# Patient Record
Sex: FEMALE | Race: WHITE | Employment: OTHER | URBAN - METROPOLITAN AREA
[De-identification: names, ages, dates, MRNs, and addresses within clinical notes are randomized per-mention and may not be internally consistent; named-entity substitution may affect disease eponyms.]

---

## 2019-03-01 ENCOUNTER — OFFICE VISIT (OUTPATIENT)
Dept: INTERNAL MEDICINE CLINIC | Age: 79
End: 2019-03-01

## 2019-03-01 VITALS
OXYGEN SATURATION: 98 % | TEMPERATURE: 98.2 F | HEIGHT: 63 IN | DIASTOLIC BLOOD PRESSURE: 70 MMHG | BODY MASS INDEX: 24.8 KG/M2 | HEART RATE: 63 BPM | RESPIRATION RATE: 18 BRPM | SYSTOLIC BLOOD PRESSURE: 138 MMHG | WEIGHT: 140 LBS

## 2019-03-01 DIAGNOSIS — C88.4 MALT (MUCOSA ASSOCIATED LYMPHOID TISSUE) (HCC): ICD-10-CM

## 2019-03-01 DIAGNOSIS — K58.9 IRRITABLE BOWEL SYNDROME, UNSPECIFIED TYPE: ICD-10-CM

## 2019-03-01 DIAGNOSIS — I10 ESSENTIAL HYPERTENSION: Primary | ICD-10-CM

## 2019-03-01 DIAGNOSIS — K80.20 CALCULUS OF GALLBLADDER WITHOUT CHOLECYSTITIS WITHOUT OBSTRUCTION: ICD-10-CM

## 2019-03-01 DIAGNOSIS — E78.2 MIXED HYPERLIPIDEMIA: ICD-10-CM

## 2019-03-01 DIAGNOSIS — I49.3 PREMATURE VENTRICULAR BEATS: ICD-10-CM

## 2019-03-01 DIAGNOSIS — E03.9 ACQUIRED HYPOTHYROIDISM: ICD-10-CM

## 2019-03-01 DIAGNOSIS — R07.82 INTERCOSTAL PAIN: ICD-10-CM

## 2019-03-01 PROBLEM — F41.8 ANXIETY ABOUT HEALTH: Status: ACTIVE | Noted: 2019-03-01

## 2019-03-01 PROBLEM — M48.061 SPINAL STENOSIS OF LUMBAR REGION WITHOUT NEUROGENIC CLAUDICATION: Status: ACTIVE | Noted: 2019-03-01

## 2019-03-01 PROBLEM — D17.71 RENAL ANGIOMYOLIPOMA: Status: ACTIVE | Noted: 2019-03-01

## 2019-03-01 RX ORDER — CHOLECALCIFEROL (VITAMIN D3) 125 MCG
100 CAPSULE ORAL DAILY
COMMUNITY

## 2019-03-01 RX ORDER — LEVOTHYROXINE SODIUM 75 UG/1
75 TABLET ORAL
Qty: 90 TAB | Refills: 3 | Status: SHIPPED | OUTPATIENT
Start: 2019-03-01

## 2019-03-01 RX ORDER — CHOLECALCIFEROL (VITAMIN D3) 125 MCG
CAPSULE ORAL
COMMUNITY

## 2019-03-01 RX ORDER — ASCORBIC ACID 500 MG
TABLET ORAL
COMMUNITY
End: 2019-08-29 | Stop reason: SDUPTHER

## 2019-03-01 RX ORDER — LOSARTAN POTASSIUM 25 MG/1
TABLET ORAL DAILY
COMMUNITY
End: 2019-04-25

## 2019-03-01 RX ORDER — CARVEDILOL 12.5 MG/1
TABLET ORAL 2 TIMES DAILY WITH MEALS
COMMUNITY
End: 2019-03-01 | Stop reason: SDUPTHER

## 2019-03-01 RX ORDER — ASPIRIN 81 MG/1
TABLET ORAL DAILY
COMMUNITY
End: 2019-04-25

## 2019-03-01 RX ORDER — CALC/MAG/B COMPLEX/D3/HERB 61
TABLET ORAL
COMMUNITY
End: 2019-04-25

## 2019-03-01 RX ORDER — CARVEDILOL 12.5 MG/1
12.5 TABLET ORAL 2 TIMES DAILY WITH MEALS
Qty: 180 TAB | Refills: 3 | Status: SHIPPED | OUTPATIENT
Start: 2019-03-01 | End: 2019-04-25

## 2019-03-01 RX ORDER — ATORVASTATIN CALCIUM 10 MG/1
TABLET, FILM COATED ORAL DAILY
COMMUNITY
End: 2019-04-25

## 2019-03-01 RX ORDER — CYANOCOBALAMIN 1000 UG/ML
12500 INJECTION, SOLUTION INTRAMUSCULAR; SUBCUTANEOUS ONCE
COMMUNITY
End: 2019-04-25

## 2019-03-01 RX ORDER — AMLODIPINE BESYLATE 5 MG/1
5 TABLET ORAL 2 TIMES DAILY
COMMUNITY
End: 2019-04-25

## 2019-03-01 RX ORDER — LEVOTHYROXINE SODIUM 75 UG/1
TABLET ORAL
COMMUNITY
End: 2019-03-01 | Stop reason: SDUPTHER

## 2019-03-01 NOTE — PROGRESS NOTES
HPI:  
*** Past Medical History:  
Diagnosis Date  GERD (gastroesophageal reflux disease)  Hypercholesterolemia  Hypertension Past Surgical History:  
Procedure Laterality Date  HX CARPAL TUNNEL RELEASE Bilateral   
 HX COLONOSCOPY    
 HX ENDOSCOPY    
 HX HYSTERECTOMY Current Outpatient Medications Medication Sig  
 lansoprazole (PREVACID) 15 mg capsule Take  by mouth Daily (before breakfast).  losartan (COZAAR) 25 mg tablet Take  by mouth daily.  amLODIPine (NORVASC) 5 mg tablet Take 5 mg by mouth two (2) times a day.  atorvastatin (LIPITOR) 10 mg tablet Take  by mouth daily.  aspirin delayed-release 81 mg tablet Take  by mouth daily.  multivits,Stress Formula-Zinc tablet Take 1 Tab by mouth daily.  cholecalciferol, vitamin D3, (VITAMIN D3) 2,000 unit tab Take  by mouth.  ascorbic acid, vitamin C, (VITAMIN C) 500 mg tablet Take  by mouth.  co-enzyme Q-10 (CO Q-10) 100 mg capsule Take 100 mg by mouth daily.  cyanocobalamin (VITAMIN B-12) 1,000 mcg/mL injection 12,500 mcg by IntraMUSCular route once.  carvedilol (COREG) 12.5 mg tablet Take 1 Tab by mouth two (2) times daily (with meals).  levothyroxine (SYNTHROID) 75 mcg tablet Take 1 Tab by mouth Daily (before breakfast). No current facility-administered medications for this visit. Allergies and Intolerances: Allergies Allergen Reactions  Latex Itching  Sulfa (Sulfonamide Antibiotics) Other (comments) arthralgia Family History:  
Family History Problem Relation Age of Onset  Heart Disease Mother  Diabetes Father  Diabetes Sister  Diabetes Brother Social History: She  reports that  has never smoked. she has never used smokeless tobacco.  
Social History Substance and Sexual Activity Alcohol Use Yes Comment: occass Immunization History: There is no immunization history on file for this patient. Review of Systems: As above included in HPI. Otherwise 11 point review of systems negative including constitutional, skin, HENT, eyes, respiratory, cardiovascular, gastrointestinal, genitourinary, musculoskeletal, endocrine, hematologic, allergy, and neurologic. Physical:  
Visit Vitals /70 (BP 1 Location: Left arm, BP Patient Position: Sitting) Pulse 63 Temp 98.2 °F (36.8 °C) (Oral) Resp 18 Ht 5' 2.5\" (1.588 m) Wt 140 lb (63.5 kg) SpO2 98% BMI 25.20 kg/m² Exam:  
ROS Physical Exam 
 
Review of Data: 
Labs: 
No results found for any previous visit. Health Maintenance Due Topic Date Due  
 DTaP/Tdap/Td series (1 - Tdap) 10/04/1962  Shingrix Vaccine Age 50> (1 of 2) 10/04/1991  GLAUCOMA SCREENING Q2Y  10/04/2006  Bone Densitometry (Dexa) Screening  10/04/2006  Pneumococcal 65+ Low/Medium Risk (1 of 2 - PCV13) 10/04/2006  Influenza Age 5 to Adult  08/01/2018 Impression: 
Patient Active Problem List  
Diagnosis Code  MALT (mucosa associated lymphoid tissue) ([facility] [address] C88.4  Essential hypertension I10  
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  
 Irritable bowel syndrome (IBS) K58.9  Spinal stenosis of lumbar region without neurogenic claudication M48.061 Diagnoses and all orders for this visit: 
 
Essential hypertension Premature ventricular beats Calculus of gallbladder without cholecystitis without obstruction Irritable bowel syndrome, unspecified type Acquired hypothyroidism MALT (mucosa associated lymphoid tissue) ([facility] [address] Mixed hyperlipidemia Other orders -     carvedilol (COREG) 12.5 mg tablet; Take 1 Tab by mouth two (2) times daily (with meals). , Print, Disp-180 Tab, R-3 
-     levothyroxine (SYNTHROID) 75 mcg tablet; Take 1 Tab by mouth Daily (before breakfast). , Print, Disp-90 Tab, R-3 Plan: 
*** 
 
 Christie Gill MD

## 2019-03-01 NOTE — PROGRESS NOTES
Chief Complaint Patient presents with  Follow-up Dr. Finley Husbands  
 Abdominal Pain  
  muscle strain 1. Have you been to the ER, urgent care clinic since your last visit? Hospitalized since your last visit? No 
 
2. Have you seen or consulted any other health care providers outside of the [address] [place] [address] since your last visit? Include any pap smears or colon screening.  No

## 2019-03-01 NOTE — PROGRESS NOTES
HPI:  
 
This is a 69year old  who presents to the office for transfer of care without any records from old practice. Information from [facility] reviewed and populated into EMR, Her medications were reconciled. She had repeat EGD performed by Dr. Dasha Martin in January without any evidence for MALT lymphoma recurrence although had mild gastritis. She ongoing problems with epigastric discomfort but continues to drink wine. She is taking Prevacid daily. She also complains of pain in both costal margins which dates back to at least last year with imaging studies unremarkable. She remembers having had a bone scan but report no on file. This is provoked when she bends over. I had previously told her that her underwire bra that sits on top of the costal margins is likely to blame but was not persuaded. She is concerned about osseous metastases. She is being followed by Dr. Santiago Araiza for hypertension and is on multi-agent therapy. She has history of labile control that I've ascribed to chronic anxiety and compulsive worry. She elected not to continue with SSRI that I prescribed. She cannot recall when her last TSH was done and would like this ordered. She affirms adherence. There is no history of tremor, palpitations or weight loss disclosed. She is requesting refills of her medications to take to the [facility] and these were provided to her. Past Medical History:  
Diagnosis Date  GERD (gastroesophageal reflux disease)  Hypercholesterolemia  Hypertension Past Surgical History:  
Procedure Laterality Date  HX CARPAL TUNNEL RELEASE Bilateral   
 HX COLONOSCOPY    
 HX ENDOSCOPY    
 HX HYSTERECTOMY Current Outpatient Medications Medication Sig  
 lansoprazole (PREVACID) 15 mg capsule Take  by mouth Daily (before breakfast).  losartan (COZAAR) 25 mg tablet Take  by mouth daily.  amLODIPine (NORVASC) 5 mg tablet Take 5 mg by mouth two (2) times a day.  atorvastatin (LIPITOR) 10 mg tablet Take  by mouth daily.  aspirin delayed-release 81 mg tablet Take  by mouth daily.  multivits,Stress Formula-Zinc tablet Take 1 Tab by mouth daily.  cholecalciferol, vitamin D3, (VITAMIN D3) 2,000 unit tab Take  by mouth.  ascorbic acid, vitamin C, (VITAMIN C) 500 mg tablet Take  by mouth.  co-enzyme Q-10 (CO Q-10) 100 mg capsule Take 100 mg by mouth daily.  cyanocobalamin (VITAMIN B-12) 1,000 mcg/mL injection 12,500 mcg by IntraMUSCular route once.  carvedilol (COREG) 12.5 mg tablet Take 1 Tab by mouth two (2) times daily (with meals).  levothyroxine (SYNTHROID) 75 mcg tablet Take 1 Tab by mouth Daily (before breakfast). No current facility-administered medications for this visit. Allergies and Intolerances: Allergies Allergen Reactions  Latex Itching  Sulfa (Sulfonamide Antibiotics) Other (comments) arthralgia Family History:  
Family History Problem Relation Age of Onset  Heart Disease Mother  Diabetes Father  Diabetes Sister  Diabetes Brother Social History: She  reports that  has never smoked. she has never used smokeless tobacco.  
Social History Substance and Sexual Activity Alcohol Use Yes Comment: occass Immunization History:   Avoid flu vaccine, Shingrix 1/2, Prevnar 13 Review of Systems Constitutional: Negative for chills, fever and weight loss. HENT: Negative for hearing loss. Respiratory: Negative for cough and shortness of breath. Cardiovascular: Positive for chest pain. Negative for leg swelling. Gastrointestinal: Positive for abdominal pain, constipation and heartburn. Negative for blood in stool. Endo/Heme/Allergies: Does not bruise/bleed easily. Psychiatric/Behavioral: The patient is nervous/anxious. The patient does not have insomnia. Physical:  
Visit Vitals /70 (BP 1 Location: Left arm, BP Patient Position: Sitting) Pulse 63  
 Temp 98.2 °F (36.8 °C) (Oral) Resp 18 Ht 5' 2.5\" (1.588 m) Wt 140 lb (63.5 kg) SpO2 98% BMI 25.20 kg/m² Physical Exam  
Constitutional: She is well-developed, well-nourished, and in no distress. Alert lady with look of worry HENT:  
Nose: Nose normal.  
Mouth/Throat: Oropharynx is clear and moist. No oropharyngeal exudate. Eyes: Conjunctivae are normal. No scleral icterus. No pallor, great dentition Neck: Neck supple. No JVD present. No tracheal deviation present. No thyromegaly present. Cardiovascular: Normal rate, regular rhythm, normal heart sounds and intact distal pulses. Pulmonary/Chest: Breath sounds normal. She has no wheezes. She has no rales. Abdominal: Soft. Bowel sounds are normal. She exhibits no distension and no mass. There is tenderness. There is no rebound. Epigastric tenderness Musculoskeletal: She exhibits no edema or deformity. Tender costal margins bilaterally Lymphadenopathy:  
  She has no cervical adenopathy. Skin: Skin is warm and dry. Vitals reviewed. Review of Data: 
Labs: 
No results found for any previous visit. Health Maintenance Due Topic Date Due  
 DTaP/Tdap/Td series (1 - Tdap) 10/04/1962  Shingrix Vaccine Age 50> (1 of 2) 10/04/1991  GLAUCOMA SCREENING Q2Y  10/04/2006  Bone Densitometry (Dexa) Screening  10/04/2006  Pneumococcal 65+ High/Highest Risk (1 of 2 - PCV13) 10/04/2006  Influenza Age 5 to Adult  08/01/2018  MEDICARE YEARLY EXAM  03/01/2019 Impression: 
Patient Active Problem List  
Diagnosis Code  MALT (mucosa associated lymphoid tissue) ([facility] [address] without recurrence Rx:  reassurance provided and surveillance through Dr. Dockery Neither C88.4  Essential hypertension controlled on mult drug regimen Rx:  management per Dr. Golden Wiley Mixed hyperlipidemia Rx:  repeat FLP ordered E78.2  Acquired hypothyroidism Rx:  TSH ordered E03.9  Cholelithiasis, asymptomatic Rx: monitor clinically K80.20  Renal angiomyolipoma Rx:  determine if renal imaging surveillance required D17.71  Spinal stenosis of lumbar region without neurogenic claudication Rx:  avoid NSAID, Tylenol, lumbar stabilization M48.061  
 Anxiety about health Rx:  reconsider opposition to SSRI F41.8 Jessica Crockett MD

## 2019-03-01 NOTE — PATIENT INSTRUCTIONS
MALT LYMPHOMA>>GONE Gastritis>>Prevacid and avoid Motrin, Aleve, Advil and alcohol Chest Wall Tenderness>>check prior imaging Thyroid>>check TSH 
 
BP>> controlled Screening :    Mammogram and colonoscopy up to date Immunizations:   2nd Shingrix due and flu vaccine declined, [place]ing[place]

## 2019-03-12 ENCOUNTER — HOSPITAL ENCOUNTER (OUTPATIENT)
Dept: LAB | Age: 79
Discharge: HOME | End: 2019-03-12
Payer: MEDICARE

## 2019-03-12 DIAGNOSIS — E78.2 MIXED HYPERLIPIDEMIA: ICD-10-CM

## 2019-03-12 DIAGNOSIS — I10 ESSENTIAL HYPERTENSION: ICD-10-CM

## 2019-03-12 DIAGNOSIS — E03.9 ACQUIRED HYPOTHYROIDISM: ICD-10-CM

## 2019-03-12 LAB
APPEARANCE UR: CLEAR
BACTERIA URNS QL MICRO: NEGATIVE /HPF
BILIRUB UR QL: NEGATIVE
COLOR UR: YELLOW
EPITH CASTS URNS QL MICRO: NORMAL /LPF (ref 0–5)
ERYTHROCYTE [DISTWIDTH] IN BLOOD BY AUTOMATED COUNT: 13.9 % (ref 11.6–14.5)
GLUCOSE UR STRIP.AUTO-MCNC: NEGATIVE MG/DL
HCT VFR BLD AUTO: 37.5 % (ref 35–45)
HGB BLD-MCNC: 11.9 G/DL (ref 12–16)
HGB UR QL STRIP: NEGATIVE
KETONES UR QL STRIP.AUTO: NEGATIVE MG/DL
LEUKOCYTE ESTERASE UR QL STRIP.AUTO: NEGATIVE
MCH RBC QN AUTO: 30.9 PG (ref 24–34)
MCHC RBC AUTO-ENTMCNC: 31.7 G/DL (ref 31–37)
MCV RBC AUTO: 97.4 FL (ref 74–97)
NITRITE UR QL STRIP.AUTO: NEGATIVE
PH UR STRIP: 8 [PH] (ref 5–8)
PLATELET # BLD AUTO: 313 K/UL (ref 135–420)
PMV BLD AUTO: 9.8 FL (ref 9.2–11.8)
PROT UR STRIP-MCNC: NEGATIVE MG/DL
RBC # BLD AUTO: 3.85 M/UL (ref 4.2–5.3)
RBC #/AREA URNS HPF: 0 /HPF (ref 0–5)
SP GR UR REFRACTOMETRY: 1.01 (ref 1–1.03)
UROBILINOGEN UR QL STRIP.AUTO: 0.2 EU/DL (ref 0.2–1)
WBC # BLD AUTO: 5.1 K/UL (ref 4.6–13.2)
WBC URNS QL MICRO: NORMAL /HPF (ref 0–4)

## 2019-03-12 PROCEDURE — 82550 ASSAY OF CK (CPK): CPT

## 2019-03-12 PROCEDURE — 84443 ASSAY THYROID STIM HORMONE: CPT

## 2019-03-12 PROCEDURE — 85027 COMPLETE CBC AUTOMATED: CPT

## 2019-03-12 PROCEDURE — 80053 COMPREHEN METABOLIC PANEL: CPT

## 2019-03-12 PROCEDURE — 80061 LIPID PANEL: CPT

## 2019-03-12 PROCEDURE — 81001 URINALYSIS AUTO W/SCOPE: CPT

## 2019-03-13 ENCOUNTER — TELEPHONE (OUTPATIENT)
Dept: INTERNAL MEDICINE CLINIC | Age: 79
End: 2019-03-13

## 2019-03-13 LAB
ALBUMIN SERPL-MCNC: 3.9 G/DL (ref 3.4–5)
ALBUMIN/GLOB SERPL: 1.1 {RATIO} (ref 0.8–1.7)
ALP SERPL-CCNC: 92 U/L (ref 45–117)
ALT SERPL-CCNC: 22 U/L (ref 13–56)
ANION GAP SERPL CALC-SCNC: 6 MMOL/L (ref 3–18)
AST SERPL-CCNC: 16 U/L (ref 15–37)
BILIRUB SERPL-MCNC: 0.4 MG/DL (ref 0.2–1)
BUN SERPL-MCNC: 10 MG/DL (ref 7–18)
BUN/CREAT SERPL: 13 (ref 12–20)
CALCIUM SERPL-MCNC: 9 MG/DL (ref 8.5–10.1)
CHLORIDE SERPL-SCNC: 99 MMOL/L (ref 100–108)
CHOLEST SERPL-MCNC: 171 MG/DL
CK SERPL-CCNC: 112 U/L (ref 26–192)
CO2 SERPL-SCNC: 27 MMOL/L (ref 21–32)
CREAT SERPL-MCNC: 0.79 MG/DL (ref 0.6–1.3)
GLOBULIN SER CALC-MCNC: 3.5 G/DL (ref 2–4)
GLUCOSE SERPL-MCNC: 92 MG/DL (ref 74–99)
HDLC SERPL-MCNC: 66 MG/DL (ref 40–60)
HDLC SERPL: 2.6 {RATIO} (ref 0–5)
LDLC SERPL CALC-MCNC: 81.8 MG/DL (ref 0–100)
LIPID PROFILE,FLP: ABNORMAL
POTASSIUM SERPL-SCNC: 4.6 MMOL/L (ref 3.5–5.5)
PROT SERPL-MCNC: 7.4 G/DL (ref 6.4–8.2)
SODIUM SERPL-SCNC: 132 MMOL/L (ref 136–145)
TRIGL SERPL-MCNC: 116 MG/DL (ref ?–150)
TSH SERPL DL<=0.05 MIU/L-ACNC: 0.7 UIU/ML (ref 0.36–3.74)
VLDLC SERPL CALC-MCNC: 23.2 MG/DL

## 2019-03-13 NOTE — TELEPHONE ENCOUNTER
Discussed results with patient and aware of slight low sodium. Will repeat next visit.  This is likely from ARB and free water intake

## 2019-04-25 ENCOUNTER — OFFICE VISIT (OUTPATIENT)
Dept: INTERNAL MEDICINE CLINIC | Age: 79
End: 2019-04-25

## 2019-04-25 ENCOUNTER — HOSPITAL ENCOUNTER (OUTPATIENT)
Dept: LAB | Age: 79
Discharge: HOME | End: 2019-04-25
Payer: MEDICARE

## 2019-04-25 VITALS
TEMPERATURE: 98.3 F | HEIGHT: 63 IN | RESPIRATION RATE: 18 BRPM | BODY MASS INDEX: 24.63 KG/M2 | HEART RATE: 58 BPM | SYSTOLIC BLOOD PRESSURE: 106 MMHG | DIASTOLIC BLOOD PRESSURE: 57 MMHG | OXYGEN SATURATION: 98 % | WEIGHT: 139 LBS

## 2019-04-25 DIAGNOSIS — M79.10 MYALGIA: ICD-10-CM

## 2019-04-25 DIAGNOSIS — M79.10 MYALGIA: Primary | ICD-10-CM

## 2019-04-25 LAB
ANION GAP SERPL CALC-SCNC: 6 MMOL/L (ref 3–18)
BUN SERPL-MCNC: 9 MG/DL (ref 7–18)
BUN/CREAT SERPL: 9 (ref 12–20)
CALCIUM SERPL-MCNC: 9.2 MG/DL (ref 8.5–10.1)
CHLORIDE SERPL-SCNC: 101 MMOL/L (ref 100–108)
CK SERPL-CCNC: 98 U/L (ref 26–192)
CO2 SERPL-SCNC: 26 MMOL/L (ref 21–32)
CREAT SERPL-MCNC: 1 MG/DL (ref 0.6–1.3)
ERYTHROCYTE [SEDIMENTATION RATE] IN BLOOD: 41 MM/HR (ref 0–30)
GLUCOSE SERPL-MCNC: 100 MG/DL (ref 74–99)
POTASSIUM SERPL-SCNC: 5 MMOL/L (ref 3.5–5.5)
SODIUM SERPL-SCNC: 133 MMOL/L (ref 136–145)

## 2019-04-25 PROCEDURE — 82550 ASSAY OF CK (CPK): CPT

## 2019-04-25 PROCEDURE — 80048 BASIC METABOLIC PNL TOTAL CA: CPT

## 2019-04-25 PROCEDURE — 85652 RBC SED RATE AUTOMATED: CPT

## 2019-04-25 NOTE — PROGRESS NOTES
HPI:     This lady comes in with complaints of deep aching pain along the right calf and foot accompanied by numbness and tingling in both feet. There is no accompanying swelling, redness or warmth. She has documented multi level DJD/DDD and has been seeing an osteopathic physician on her own for adjustments without relief. She was prescribed Evoxac by Dr. SINGH [facility] for xerostomia but developed weakness on this medication and stopped it entirely. She did not notify him of this reaction. She also longstanding pain in both costal margins that I've ascribed to mechanical irritation from her underwire bra. I reminded her that she had a whole bone scan in December 2018 that was negative for osseous metastases and negative PET scan in October 2018. Past Medical History:   Diagnosis Date    Angiomyolipoma     Left Kidney    Cholelithiasis     Gallstones     Ganglion cyst     GERD (gastroesophageal reflux disease)     High cholesterol     HTN (hypertension)     Hypercholesterolemia     Hypertension     IBS (irritable bowel syndrome)     Palpitations     Peripheral neuropathy     Sciatica     Sjogren's disease ([facility] [address]     Stroke ([facility] [address]      Past Surgical History:   Procedure Laterality Date    HX CARPAL TUNNEL RELEASE      HX CARPAL TUNNEL RELEASE Bilateral     HX COLONOSCOPY      HX ENDOSCOPY      HX HYSTERECTOMY       Current Outpatient Medications   Medication Sig    multivits,Stress Formula-Zinc tablet Take 1 Tab by mouth daily.  cholecalciferol, vitamin D3, (VITAMIN D3) 2,000 unit tab Take  by mouth.  ascorbic acid, vitamin C, (VITAMIN C) 500 mg tablet Take  by mouth.  co-enzyme Q-10 (CO Q-10) 100 mg capsule Take 100 mg by mouth daily.  levothyroxine (SYNTHROID) 75 mcg tablet Take 1 Tab by mouth Daily (before breakfast).     amLODIPine (NORVASC) 5 mg tablet     carvedilol (COREG) 12.5 mg tablet     lansoprazole (PREVACID) 15 mg capsule     atorvastatin (LIPITOR) 10 mg tablet     losartan (COZAAR) 25 mg tablet Take  by mouth daily.  cyanocobalamin (VITAMIN B-12) 1,000 mcg tablet Take 1,000 mcg by mouth daily.  FEROSUL 325 mg (65 mg iron) tablet     aspirin delayed-release 81 mg tablet Take  by mouth daily.  REFRESH CELLUVISC 1 % ophthalmic solution     polyethylene glycol (MIRALAX) 17 gram/dose powder      No current facility-administered medications for this visit. Allergies and Intolerances: Allergies   Allergen Reactions    Latex Itching    Evoxac [Cevimeline] Other (comments)     fatigue    Sulfa (Sulfonamide Antibiotics) Other (comments)     unknown     Family History:   Family History   Problem Relation Age of Onset    Heart Disease Mother     Diabetes Father     Diabetes Sister     Diabetes Brother     Other Brother         Jud DM     Social History:   She  reports that she has never smoked. She has never used smokeless tobacco.   Social History     Substance and Sexual Activity   Alcohol Use Yes    Comment: occass     IPhysical:   Visit Vitals  /57 (BP 1 Location: Left arm, BP Patient Position: Sitting)   Pulse (!) 58   Temp 98.3 °F (36.8 °C) (Oral)   Resp 18   Ht 5' 2.5\" (1.588 m)   Wt 139 lb (63 kg)   SpO2 98%   BMI 25.02 kg/m²          Physical Exam   Constitutional:   Alert lady with look of perpetual worry   Eyes: Conjunctivae are normal. No scleral icterus. Neck: No JVD present. Cardiovascular: Regular rhythm and normal heart sounds. Exam reveals no gallop. No murmur heard. Tender costal margins   Abdominal: Bowel sounds are normal. There is no tenderness. Lymphadenopathy:     She has no cervical adenopathy. Neurological: She has normal strength. Reflex Scores:       Patellar reflexes are 1+ on the right side and 1+ on the left side. Achilles reflexes are 1+ on the right side and 1+ on the left side. Reduced vibratory perception feet   Vitals reviewed. Review of Data:  Labs:     Impression/Plan:   Patient Active Problem List   Diagnosis  Code    Right leg pain referred from lumbar DJD with spinal stenosis and less likely statin myopathy Trial off Lipitor, steroid declined, consider DAHLIA +/- PT, labs   M48.062    Sjogren's disease with peripheral neuropathy Alert Dr. Mirela Santiago to adverse reaction to Evoxac   I10    Chest wall pain form mechanical irritation with negative PET and bone scan switch to camisole, reassurance R07.82         Lara Levy MD

## 2019-04-29 DIAGNOSIS — M48.061 SPINAL STENOSIS OF LUMBAR REGION WITHOUT NEUROGENIC CLAUDICATION: Primary | ICD-10-CM

## 2019-04-29 DIAGNOSIS — M48.061 SPINAL STENOSIS OF LUMBAR REGION WITHOUT NEUROGENIC CLAUDICATION: ICD-10-CM

## 2019-04-29 RX ORDER — PREDNISONE 20 MG/1
TABLET ORAL
Qty: 12 TAB | Refills: 1 | Status: SHIPPED | OUTPATIENT
Start: 2019-04-29 | End: 2019-04-29 | Stop reason: SDUPTHER

## 2019-04-29 RX ORDER — PREDNISONE 20 MG/1
TABLET ORAL
Qty: 12 TAB | Refills: 1 | Status: SHIPPED | OUTPATIENT
Start: 2019-04-29

## 2019-04-29 NOTE — TELEPHONE ENCOUNTER
Patient made aware of lab results significant for mild hyponatremia and mild ESR elevation. She has Sjogren's and has been drinking a lot of free water. I advised trial of steroid taper to determine if this has a favorable effect on her symptoms (PMR vs sciatica), continue off Lipitor and to change to more isotonic fluid and provide follow up call by Friday regarding treatment response.

## 2019-06-10 ENCOUNTER — TELEPHONE (OUTPATIENT)
Dept: INTERNAL MEDICINE CLINIC | Age: 79
End: 2019-06-10

## 2019-06-10 DIAGNOSIS — M54.50 ACUTE BILATERAL LOW BACK PAIN WITHOUT SCIATICA: Primary | ICD-10-CM

## 2019-06-10 NOTE — TELEPHONE ENCOUNTER
Pt called, she went to ER Friday because she fell due to the cramps in her legs and they just gave out on her. She says she landed on her bottom with all her weight. She says they did an Xray but says you need to do an MRI. m she says that she was told she has compression to vertebrae at L2-L3  She feels its her hip, she says she is in the bed, very painful and can barely move. Can we order the MRI.

## 2019-06-11 ENCOUNTER — TELEPHONE (OUTPATIENT)
Dept: INTERNAL MEDICINE CLINIC | Age: 79
End: 2019-06-11

## 2019-06-11 DIAGNOSIS — S32.010D CLOSED COMPRESSION FRACTURE OF L1 LUMBAR VERTEBRA WITH ROUTINE HEALING, SUBSEQUENT ENCOUNTER: ICD-10-CM

## 2019-06-11 DIAGNOSIS — M85.88 OSTEOPENIA OF LUMBAR SPINE: Primary | ICD-10-CM

## 2019-06-11 NOTE — TELEPHONE ENCOUNTER
Pt is requesting for you to call Dr. Krissy Pedroza and talk to him to be able to get patient in sooner, she is in a lot of pain and unable to lay down and feel comfortable. Uday Diane number for Dr. Elizabeth Haque is [phone]. She has appointment with Dr. Elizabeth Haque tomorrow and I advised her to keep it but made her aware that she has L1 fracture from the fall and would benefit from vertebroplasty and that I sent a consultation to IR at [facility] and to wait for the call.  I will obtain last DEXA to find out if she has osteoporosis that requires treatment

## 2019-06-14 ENCOUNTER — TELEPHONE (OUTPATIENT)
Dept: INTERNAL MEDICINE CLINIC | Age: 79
End: 2019-06-14

## 2019-06-14 NOTE — TELEPHONE ENCOUNTER
Pt called this afternoon in tears and said she is in a lot of pain, her hip is hurting her and she is unsure if it is residual effects of her fall. She said she had her x-ray @ [facility] yesterday and wants to know what her results are. I'm assuming they are ready yet, but on the off chance they are can we let her know? Or can you give her something to help ease the pain over the weekend?

## 2019-06-17 ENCOUNTER — OFFICE VISIT (OUTPATIENT)
Dept: INTERNAL MEDICINE CLINIC | Age: 79
End: 2019-06-17

## 2019-06-17 VITALS
OXYGEN SATURATION: 99 % | WEIGHT: 136 LBS | BODY MASS INDEX: 24.1 KG/M2 | RESPIRATION RATE: 18 BRPM | DIASTOLIC BLOOD PRESSURE: 70 MMHG | HEIGHT: 63 IN | TEMPERATURE: 98.4 F | HEART RATE: 60 BPM | SYSTOLIC BLOOD PRESSURE: 152 MMHG

## 2019-06-17 DIAGNOSIS — S32.018G OTHER CLOSED FRACTURE OF FIRST LUMBAR VERTEBRA WITH DELAYED HEALING, SUBSEQUENT ENCOUNTER: Primary | ICD-10-CM

## 2019-06-17 NOTE — PROGRESS NOTES
Chief Complaint   Patient presents with    Back Pain     1. Have you been to the ER, urgent care clinic since your last visit? Hospitalized since your last visit? Yes Where: [facility]    2. Have you seen or consulted any other health care providers outside of the [address] [place] [address] since your last visit? Include any pap smears or colon screening.  No

## 2019-06-17 NOTE — PROGRESS NOTES
HPI:     This patient was evaluated by Dr. Makeda Stephens for back pain related to L1 fracture following a fall. He recommended brace for 4 weeks and prescribed Relafen, Ultram and Flexeril. She did not get the brace until today and has already noticed a difference. She had called while I was out of town complaining of lack of relief with prescribed regimen. She wanted to proceed with vertebroplasty I had arranged but Dr. Makeda Stephens wanted to hold off for 4 weeks. She also complains of pain in both hips and is convinced that her hips were fracture despite being able to bear weight, change from sitting to standing and vice versa and walk. The X-rays ordered by Dr. Makeda Stephens were negative for fracture. Past Medical History:   Diagnosis Date    Angiomyolipoma     Left Kidney    Cholelithiasis     Gallstones     Ganglion cyst     GERD (gastroesophageal reflux disease)     High cholesterol     HTN (hypertension)     Hypercholesterolemia     Hypertension     IBS (irritable bowel syndrome)     Palpitations     Peripheral neuropathy     Sciatica     Sjogren's disease ([address]     Stroke ([facility] [address]      Past Surgical History:   Procedure Laterality Date    HX CARPAL TUNNEL RELEASE      HX CARPAL TUNNEL RELEASE Bilateral     HX COLONOSCOPY      HX ENDOSCOPY      HX HYSTERECTOMY       Current Outpatient Medications   Medication Sig    predniSONE (DELTASONE) 20 mg tablet 3 tablets a day x 2 days then 2 tablets a day x 2 days then 1 tablet a day x 2 days then STOP  Indications: sciatica    multivits,Stress Formula-Zinc tablet Take 1 Tab by mouth daily.  cholecalciferol, vitamin D3, (VITAMIN D3) 2,000 unit tab Take  by mouth.  ascorbic acid, vitamin C, (VITAMIN C) 500 mg tablet Take  by mouth.  co-enzyme Q-10 (CO Q-10) 100 mg capsule Take 100 mg by mouth daily.  levothyroxine (SYNTHROID) 75 mcg tablet Take 1 Tab by mouth Daily (before breakfast).     amLODIPine (NORVASC) 5 mg tablet     carvedilol (COREG) 12.5 mg tablet     lansoprazole (PREVACID) 15 mg capsule     atorvastatin (LIPITOR) 10 mg tablet     losartan (COZAAR) 25 mg tablet Take  by mouth daily.  cyanocobalamin (VITAMIN B-12) 1,000 mcg tablet Take 1,000 mcg by mouth daily.  FEROSUL 325 mg (65 mg iron) tablet     aspirin delayed-release 81 mg tablet Take  by mouth daily.  REFRESH CELLUVISC 1 % ophthalmic solution     polyethylene glycol (MIRALAX) 17 gram/dose powder      No current facility-administered medications for this visit. Allergies and Intolerances: Allergies   Allergen Reactions    Latex Itching    Evoxac [Cevimeline] Other (comments)     fatigue    Sulfa (Sulfonamide Antibiotics) Other (comments)     unknown     Family History:   Family History   Problem Relation Age of Onset    Heart Disease Mother     Diabetes Father     Diabetes Sister     Diabetes Brother     Other Brother         Kishorerradha DM     Social History:   She  reports that she has never smoked. She has never used smokeless tobacco.   Social History     Substance and Sexual Activity   Alcohol Use Yes    Comment: occass     Physical:   Visit Vitals  /70 (BP 1 Location: Left arm, BP Patient Position: Sitting)   Pulse 60   Temp 98.4 °F (36.9 °C) (Oral)   Resp 18   Ht 5' 2.5\" (1.588 m)   Wt 136 lb (61.7 kg)   SpO2 99%   BMI 24.48 kg/m²          Physical Exam   Constitutional: She is well-developed, well-nourished, and in no distress. Musculoskeletal:        Back:         Legs:  Neurological: She has normal strength. Reflex Scores:       Patellar reflexes are 0 on the right side and 0 on the left side. Achilles reflexes are 0 on the right side and 0 on the left side. Vitals reviewed.     Review of Data:  Labs:  Hospital Outpatient Visit on 04/25/2019   Component Date Value Ref Range Status    Sed rate, automated 04/25/2019 41* 0 - 30 mm/hr Final    CK 04/25/2019 98  26 - 192 U/L Final    Sodium 04/25/2019 133* 136 - 145 mmol/L Final    Potassium 04/25/2019 5.0  3.5 - 5.5 mmol/L Final    Chloride 04/25/2019 101  100 - 108 mmol/L Final    CO2 04/25/2019 26  21 - 32 mmol/L Final    Anion gap 04/25/2019 6  3.0 - 18 mmol/L Final    Glucose 04/25/2019 100* 74 - 99 mg/dL Final    BUN 04/25/2019 9  7.0 - 18 MG/DL Final    Creatinine 04/25/2019 1.00  0.6 - 1.3 MG/DL Final    BUN/Creatinine ratio 04/25/2019 9* 12 - 20   Final    GFR est AA 04/25/2019 >60  >60 ml/min/1.73m2 Final    GFR est non-AA 04/25/2019 54* >60 ml/min/1.73m2 Final    Calcium 04/25/2019 9.2  8.5 - 10.1 MG/DL Final       Impression/Plan:   Patient Active Problem List   Diagnosis  Code    L1 vertebral fracture secondary to fall Management as outlined by Dr. Aubree Maki, vertebroplasty on hold   C88.4    Bilateral hip pain likely referred from above (myofascial component) Referral to Dr. Corinne Dunham if not improved significantly by end of week, continue ice compress and gentle stretching exercises   M25.551, M25.552    Essential hypertension with elevation from pain response Monitor BP trend with better pain control Johnny Ashton MD

## 2019-06-17 NOTE — PATIENT INSTRUCTIONS
L1 fracture and will need brace for 4 weeks + Relafen + ice before considering vertebroplasty. NO EVIDENCE FOR HIP FRACTURE Pain management Dr. Raul Sandoval to look at hip and SI joint for possible steroid injection

## 2019-06-20 ENCOUNTER — TELEPHONE (OUTPATIENT)
Dept: INTERNAL MEDICINE CLINIC | Age: 79
End: 2019-06-20

## 2019-06-24 ENCOUNTER — TELEPHONE (OUTPATIENT)
Dept: INTERNAL MEDICINE CLINIC | Age: 79
End: 2019-06-24

## 2019-06-24 NOTE — TELEPHONE ENCOUNTER
Pt calling with several questions for the doctor (a few regarding pain management) and would like a call back. (I had a hard time understanding exactly what the pt was asking for).

## 2019-06-25 NOTE — TELEPHONE ENCOUNTER
PT called in today and really needs advice and wants to speak to you today.  Please call pt as soon as you can per pt request.

## 2019-06-26 ENCOUNTER — OFFICE VISIT (OUTPATIENT)
Dept: INTERNAL MEDICINE CLINIC | Age: 79
End: 2019-06-26

## 2019-06-26 VITALS
SYSTOLIC BLOOD PRESSURE: 116 MMHG | TEMPERATURE: 98.8 F | WEIGHT: 133 LBS | OXYGEN SATURATION: 97 % | HEIGHT: 63 IN | RESPIRATION RATE: 18 BRPM | HEART RATE: 63 BPM | BODY MASS INDEX: 23.57 KG/M2 | DIASTOLIC BLOOD PRESSURE: 66 MMHG

## 2019-06-26 DIAGNOSIS — S32.018G OTHER CLOSED FRACTURE OF FIRST LUMBAR VERTEBRA WITH DELAYED HEALING, SUBSEQUENT ENCOUNTER: Primary | ICD-10-CM

## 2019-06-26 PROBLEM — S32.019A L1 VERTEBRAL FRACTURE (HCC): Status: ACTIVE | Noted: 2019-06-26

## 2019-06-26 PROBLEM — M85.88 OSTEOPENIA OF LUMBAR SPINE: Status: ACTIVE | Noted: 2019-06-26

## 2019-06-26 NOTE — PATIENT INSTRUCTIONS
L1 vertebral body fracture: 1. Vertebroplasty if not better after July 8 
2. Use brace every day as directed 3. Prednisone for inflammation 4. Tramadol (opioid) and Flexeril (night time) for pain and spasm 5. Ice compress x 20 minutes Osteopenia 1. Take Miacalcin NS to improve bone density per Dr. Radha Bhatia

## 2019-06-26 NOTE — PROGRESS NOTES
HPI:     This patient presents to the office for follow up of her mid back pain related to L1 vertebral body fracture after a fall. She was initially scheduled for vertebroplasty but Dr. Ellis Almonte wanted to hold off on this for 4 weeks and prescribed a TLSO.  brace which she is not wearing today as she afraid she may be stopped by a . She claims that the Ultram, Flexeril and Relafen prescribed for her were not effective. She was seen by Dr. Jamil Lion who prescribed topical analgesic cream, Miacalcin NS, referral to PT and IR not realizing that arrangements for the latter had been previously made and cancelled. She would like to pursue this and I told her that I will need to follow Dr. Renate Velasquez recommendations to hold off for the period of 4 weeks recommended. She denies any paresthesias or leg weakness or incontinence. She wants the pain to disappear ASAP. Past Medical History:   Diagnosis Date    Angiomyolipoma     Left Kidney    Cholelithiasis     Gallstones     Ganglion cyst     GERD (gastroesophageal reflux disease)     High cholesterol     HTN (hypertension)     Hypercholesterolemia     Hypertension     IBS (irritable bowel syndrome)     Palpitations     Peripheral neuropathy     Sciatica     Sjogren's disease ([address]     Stroke ([address]      Past Surgical History:   Procedure Laterality Date    HX CARPAL TUNNEL RELEASE      HX CARPAL TUNNEL RELEASE Bilateral     HX COLONOSCOPY      HX ENDOSCOPY      HX HYSTERECTOMY       Current Outpatient Medications   Medication Sig    predniSONE (DELTASONE) 20 mg tablet 3 tablets a day x 2 days then 2 tablets a day x 2 days then 1 tablet a day x 2 days then STOP  Indications: sciatica    multivits,Stress Formula-Zinc tablet Take 1 Tab by mouth daily.  cholecalciferol, vitamin D3, (VITAMIN D3) 2,000 unit tab Take  by mouth.  ascorbic acid, vitamin C, (VITAMIN C) 500 mg tablet Take  by mouth.     co-enzyme Q-10 (CO Q-10) 100 mg capsule Take 100 mg by mouth daily.  levothyroxine (SYNTHROID) 75 mcg tablet Take 1 Tab by mouth Daily (before breakfast).  amLODIPine (NORVASC) 5 mg tablet     carvedilol (COREG) 12.5 mg tablet     lansoprazole (PREVACID) 15 mg capsule     atorvastatin (LIPITOR) 10 mg tablet     losartan (COZAAR) 25 mg tablet Take  by mouth daily.  cyanocobalamin (VITAMIN B-12) 1,000 mcg tablet Take 1,000 mcg by mouth daily.  FEROSUL 325 mg (65 mg iron) tablet     aspirin delayed-release 81 mg tablet Take  by mouth daily.  REFRESH CELLUVISC 1 % ophthalmic solution     polyethylene glycol (MIRALAX) 17 gram/dose powder      No current facility-administered medications for this visit. Allergies and Intolerances: Allergies   Allergen Reactions    Latex Itching    Evoxac [Cevimeline] Other (comments)     fatigue    Sulfa (Sulfonamide Antibiotics) Other (comments)     unknown     Family History:   Family History   Problem Relation Age of Onset    Heart Disease Mother     Diabetes Father     Diabetes Sister     Diabetes Brother     Other Brother         Boderline DM     Social History:   She  reports that she has never smoked. She has never used smokeless tobacco.   Social History     Substance and Sexual Activity   Alcohol Use Yes    Comment: occass       Physical:   Visit Vitals  /66 (BP 1 Location: Left arm, BP Patient Position: Sitting)   Pulse 63   Temp 98.8 °F (37.1 °C) (Oral)   Resp 18   Ht 5' 2.5\" (1.588 m)   Wt 133 lb (60.3 kg)   SpO2 97%   BMI 23.94 kg/m²          Physical Exam   Neurological: She is alert. She has normal sensation and normal strength. No sensory deficit. Reflex Scores:       Patellar reflexes are 2+ on the right side and 2+ on the left side. Achilles reflexes are 2+ on the right side and 2+ on the left side.        Review of Data:  Labs:  Hospital Outpatient Visit on 04/25/2019   Component Date Value Ref Range Status    Sed rate, automated 04/25/2019 41* 0 - 30 mm/hr Final    CK 04/25/2019 98  26 - 192 U/L Final    Sodium 04/25/2019 133* 136 - 145 mmol/L Final    Potassium 04/25/2019 5.0  3.5 - 5.5 mmol/L Final    Chloride 04/25/2019 101  100 - 108 mmol/L Final    CO2 04/25/2019 26  21 - 32 mmol/L Final    Anion gap 04/25/2019 6  3.0 - 18 mmol/L Final    Glucose 04/25/2019 100* 74 - 99 mg/dL Final    BUN 04/25/2019 9  7.0 - 18 MG/DL Final    Creatinine 04/25/2019 1.00  0.6 - 1.3 MG/DL Final    BUN/Creatinine ratio 04/25/2019 9* 12 - 20   Final    GFR est AA 04/25/2019 >60  >60 ml/min/1.73m2 Final    GFR est non-AA 04/25/2019 54* >60 ml/min/1.73m2 Final    Calcium 04/25/2019 9.2  8.5 - 10.1 MG/DL Final     Impression/Plan:   Patient Active Problem List   Diagnosis  Code    L1 vertebral body fracture from trauman Use brace as ordered by Dr. Sotero Powers, pursue vertebroplasty if not improved by July 8, PT referral   S32.019A    Osteopenia  Consider Forteo M85.88     Linda Schulte MD

## 2019-06-28 ENCOUNTER — TELEPHONE (OUTPATIENT)
Dept: INTERNAL MEDICINE CLINIC | Age: 79
End: 2019-06-28

## 2019-07-01 ENCOUNTER — TELEPHONE (OUTPATIENT)
Dept: INTERNAL MEDICINE CLINIC | Age: 79
End: 2019-07-01

## 2019-07-01 NOTE — TELEPHONE ENCOUNTER
Winsome Ayala with [facility] called to let us know that she called pt to schedule for a kyphoplasty. Pt does not want it done. Pt wants to wear brace.

## 2019-07-10 NOTE — TELEPHONE ENCOUNTER
Spoke to pt and after looking them up for her, then called back and left message about the dosage for the calcium and magnesium.

## 2019-07-12 ENCOUNTER — TELEPHONE (OUTPATIENT)
Dept: INTERNAL MEDICINE CLINIC | Age: 79
End: 2019-07-12

## 2019-07-29 ENCOUNTER — TELEPHONE (OUTPATIENT)
Dept: INTERNAL MEDICINE CLINIC | Age: 79
End: 2019-07-29

## 2019-07-29 DIAGNOSIS — E03.9 ACQUIRED HYPOTHYROIDISM: ICD-10-CM

## 2019-07-29 DIAGNOSIS — E78.2 MIXED HYPERLIPIDEMIA: ICD-10-CM

## 2019-07-29 DIAGNOSIS — I10 ESSENTIAL HYPERTENSION: Primary | ICD-10-CM

## 2019-08-22 ENCOUNTER — HOSPITAL ENCOUNTER (OUTPATIENT)
Dept: LAB | Age: 79
Discharge: HOME | End: 2019-08-22
Payer: MEDICARE

## 2019-08-22 DIAGNOSIS — E78.2 MIXED HYPERLIPIDEMIA: ICD-10-CM

## 2019-08-22 DIAGNOSIS — I10 ESSENTIAL HYPERTENSION: ICD-10-CM

## 2019-08-22 LAB
ALBUMIN SERPL-MCNC: 4 G/DL (ref 3.4–5)
ALBUMIN/GLOB SERPL: 1.1 {RATIO} (ref 0.8–1.7)
ALP SERPL-CCNC: 99 U/L (ref 45–117)
ALT SERPL-CCNC: 24 U/L (ref 13–56)
ANION GAP SERPL CALC-SCNC: 3 MMOL/L (ref 3–18)
APPEARANCE UR: CLEAR
AST SERPL-CCNC: 15 U/L (ref 10–38)
BACTERIA URNS QL MICRO: NEGATIVE /HPF
BILIRUB SERPL-MCNC: 0.5 MG/DL (ref 0.2–1)
BILIRUB UR QL: NEGATIVE
BUN SERPL-MCNC: 16 MG/DL (ref 7–18)
BUN/CREAT SERPL: 20 (ref 12–20)
CALCIUM SERPL-MCNC: 9.5 MG/DL (ref 8.5–10.1)
CHLORIDE SERPL-SCNC: 102 MMOL/L (ref 100–111)
CHOLEST SERPL-MCNC: 180 MG/DL
CO2 SERPL-SCNC: 30 MMOL/L (ref 21–32)
COLOR UR: YELLOW
CREAT SERPL-MCNC: 0.82 MG/DL (ref 0.6–1.3)
EPITH CASTS URNS QL MICRO: NORMAL /LPF (ref 0–5)
ERYTHROCYTE [DISTWIDTH] IN BLOOD BY AUTOMATED COUNT: 13.9 % (ref 11.6–14.5)
GLOBULIN SER CALC-MCNC: 3.6 G/DL (ref 2–4)
GLUCOSE SERPL-MCNC: 104 MG/DL (ref 74–99)
GLUCOSE UR STRIP.AUTO-MCNC: NEGATIVE MG/DL
HCT VFR BLD AUTO: 37.1 % (ref 35–45)
HDLC SERPL-MCNC: 59 MG/DL (ref 40–60)
HDLC SERPL: 3.1 {RATIO} (ref 0–5)
HGB BLD-MCNC: 12.1 G/DL (ref 12–16)
HGB UR QL STRIP: NEGATIVE
KETONES UR QL STRIP.AUTO: NEGATIVE MG/DL
LDLC SERPL CALC-MCNC: 95.2 MG/DL (ref 0–100)
LEUKOCYTE ESTERASE UR QL STRIP.AUTO: NEGATIVE
LIPID PROFILE,FLP: NORMAL
MCH RBC QN AUTO: 31.1 PG (ref 24–34)
MCHC RBC AUTO-ENTMCNC: 32.6 G/DL (ref 31–37)
MCV RBC AUTO: 95.4 FL (ref 74–97)
NITRITE UR QL STRIP.AUTO: NEGATIVE
PH UR STRIP: 7.5 [PH] (ref 5–8)
PLATELET # BLD AUTO: 355 K/UL (ref 135–420)
PMV BLD AUTO: 9.5 FL (ref 9.2–11.8)
POTASSIUM SERPL-SCNC: 4.5 MMOL/L (ref 3.5–5.5)
PROT SERPL-MCNC: 7.6 G/DL (ref 6.4–8.2)
PROT UR STRIP-MCNC: NEGATIVE MG/DL
RBC # BLD AUTO: 3.89 M/UL (ref 4.2–5.3)
RBC #/AREA URNS HPF: 0 /HPF (ref 0–5)
SODIUM SERPL-SCNC: 135 MMOL/L (ref 136–145)
SP GR UR REFRACTOMETRY: 1.01 (ref 1–1.03)
TRIGL SERPL-MCNC: 129 MG/DL (ref ?–150)
UROBILINOGEN UR QL STRIP.AUTO: 0.2 EU/DL (ref 0.2–1)
VLDLC SERPL CALC-MCNC: 25.8 MG/DL
WBC # BLD AUTO: 5.6 K/UL (ref 4.6–13.2)
WBC URNS QL MICRO: NORMAL /HPF (ref 0–4)

## 2019-08-22 PROCEDURE — 81001 URINALYSIS AUTO W/SCOPE: CPT

## 2019-08-22 PROCEDURE — 80061 LIPID PANEL: CPT

## 2019-08-22 PROCEDURE — 85027 COMPLETE CBC AUTOMATED: CPT

## 2019-08-22 PROCEDURE — 36415 COLL VENOUS BLD VENIPUNCTURE: CPT

## 2019-08-22 PROCEDURE — 80053 COMPREHEN METABOLIC PANEL: CPT

## 2019-08-29 ENCOUNTER — OFFICE VISIT (OUTPATIENT)
Dept: INTERNAL MEDICINE CLINIC | Age: 79
End: 2019-08-29

## 2019-08-29 VITALS
BODY MASS INDEX: 24.1 KG/M2 | HEIGHT: 63 IN | TEMPERATURE: 98.3 F | HEART RATE: 60 BPM | DIASTOLIC BLOOD PRESSURE: 65 MMHG | WEIGHT: 136 LBS | SYSTOLIC BLOOD PRESSURE: 122 MMHG | RESPIRATION RATE: 18 BRPM | OXYGEN SATURATION: 97 %

## 2019-08-29 DIAGNOSIS — E78.2 MIXED HYPERLIPIDEMIA: ICD-10-CM

## 2019-08-29 DIAGNOSIS — M85.88 OSTEOPENIA OF LUMBAR SPINE: ICD-10-CM

## 2019-08-29 DIAGNOSIS — S32.010D CLOSED WEDGE COMPRESSION FRACTURE OF FIRST LUMBAR VERTEBRA WITH ROUTINE HEALING, SUBSEQUENT ENCOUNTER: ICD-10-CM

## 2019-08-29 DIAGNOSIS — R07.82 INTERCOSTAL PAIN: ICD-10-CM

## 2019-08-29 DIAGNOSIS — C88.4 MALT (MUCOSA ASSOCIATED LYMPHOID TISSUE) (HCC): ICD-10-CM

## 2019-08-29 DIAGNOSIS — G60.9 IDIOPATHIC PERIPHERAL NEUROPATHY: ICD-10-CM

## 2019-08-29 DIAGNOSIS — I10 ESSENTIAL HYPERTENSION: Primary | ICD-10-CM

## 2019-08-29 DIAGNOSIS — E03.9 ACQUIRED HYPOTHYROIDISM: ICD-10-CM

## 2019-08-29 RX ORDER — BACITRACIN ZINC 500 UNIT/G
OINTMENT (GRAM) TOPICAL
Qty: 15 G | Refills: 3 | Status: SHIPPED | OUTPATIENT
Start: 2019-08-29

## 2019-08-29 RX ORDER — KETOCONAZOLE 20 MG/ML
SHAMPOO TOPICAL
Qty: 1 BOTTLE | Refills: 11 | Status: SHIPPED | OUTPATIENT
Start: 2019-08-29

## 2019-08-29 RX ORDER — ATORVASTATIN CALCIUM 10 MG/1
10 TABLET, FILM COATED ORAL
Qty: 90 TAB | Refills: 3 | Status: SHIPPED | OUTPATIENT
Start: 2019-08-29

## 2019-08-29 RX ORDER — ASCORBIC ACID 500 MG
500 TABLET ORAL DAILY
Qty: 90 TAB | Refills: 3 | Status: SHIPPED | OUTPATIENT
Start: 2019-08-29

## 2019-08-29 RX ORDER — KETOCONAZOLE 20 MG/ML
SHAMPOO TOPICAL
Qty: 1 BOTTLE | Refills: 11 | Status: SHIPPED | OUTPATIENT
Start: 2019-08-29 | End: 2019-08-29 | Stop reason: SDUPTHER

## 2019-08-29 NOTE — PROGRESS NOTES
HPI:     This patient is requesting refills of several OTC medications to take to the [facility]. Each one had additional refills but wanted to ensure steady supply. She has made arrangements to see Dr. Frandy Herman at [facility]. She reports that the back pain from L1 compression fracture has steadily improved following TLSO brace and PT. She decided against pursuing kyphoplasty. She has a follow up arranged with Dr. Lily Villa. She complains of persistent pain along both costal margins which has been a constant lament over the past 2 years with negative PET/CT in October 2018 and negative cardiac evaluation through Dr. Naren Guevara. I suggested switch from her underwire brassiere to a different model but did not follow through. She also declined pain management consultation. She also has a follow up arranged with Dr. Joseph Colindres for [facility] . The last EGD failed to reveal evidence for recurrence. She has ongoing problems with dyspepsia which has been partially relieved with Prevacid. Past Medical History:   Diagnosis Date    Angiomyolipoma     Left Kidney    Cholelithiasis     Gallstones     Ganglion cyst     GERD (gastroesophageal reflux disease)     High cholesterol     HTN (hypertension)     Hypercholesterolemia     Hypertension     IBS (irritable bowel syndrome)     L1 vertebral fracture (HCC) 6/26/2019    Osteopenia of lumbar spine 6/26/2019    Palpitations     Peripheral neuropathy     Sciatica     [facility]ren's [facility]     Stroke ([facility]      Past Surgical History:   Procedure Laterality Date    HX CARPAL TUNNEL RELEASE      HX CARPAL TUNNEL RELEASE Bilateral     HX COLONOSCOPY      HX ENDOSCOPY      HX HYSTERECTOMY       Current Outpatient Medications   Medication Sig    ketoconazole (NIZORAL) 2 % shampoo Apply to scalp and lather MWF    ascorbic acid, vitamin C, (VITAMIN C) 500 mg tablet Take 1 Tab by mouth daily.  atorvastatin (LIPITOR) 10 mg tablet Take 1 Tab by mouth nightly.     bacitracin zinc (BACITRACIN) ointment Apply bid to wound until healed (max 7 days)    predniSONE (DELTASONE) 20 mg tablet 3 tablets a day x 2 days then 2 tablets a day x 2 days then 1 tablet a day x 2 days then STOP  Indications: sciatica    multivits,Stress Formula-Zinc tablet Take 1 Tab by mouth daily.  cholecalciferol, vitamin D3, (VITAMIN D3) 2,000 unit tab Take  by mouth.  co-enzyme Q-10 (CO Q-10) 100 mg capsule Take 100 mg by mouth daily.  levothyroxine (SYNTHROID) 75 mcg tablet Take 1 Tab by mouth Daily (before breakfast).  amLODIPine (NORVASC) 5 mg tablet     carvedilol (COREG) 12.5 mg tablet     lansoprazole (PREVACID) 15 mg capsule     losartan (COZAAR) 25 mg tablet Take  by mouth daily.  cyanocobalamin (VITAMIN B-12) 1,000 mcg tablet Take 1,000 mcg by mouth daily.  FEROSUL 325 mg (65 mg iron) tablet     aspirin delayed-release 81 mg tablet Take  by mouth daily.  REFRESH CELLUVISC 1 % ophthalmic solution     polyethylene glycol (MIRALAX) 17 gram/dose powder      No current facility-administered medications for this visit. Allergies and Intolerances: Allergies   Allergen Reactions    Latex Itching    Evoxac [Cevimeline] Other (comments)     fatigue    Sulfa (Sulfonamide Antibiotics) Other (comments)     unknown     Family History:   Family History   Problem Relation Age of Onset    Heart Disease Mother     Diabetes Father     Diabetes Sister     Diabetes Brother     Other Brother         Boderline DM     Social History:   She  reports that she has never smoked.  She has never used smokeless tobacco.   Social History     Substance and Sexual Activity   Alcohol Use Yes    Comment: occass     Immunization History:    Shingrix, Prevnar, Pneumovax received but avoids flu vaccine    Diet:                                Regular    Exercise:                        Walking    Screening:                      Colonoscopy 2017, Mammogram 2019, DEXA 2018    Review of Systems Constitutional: Negative for weight loss. HENT: Negative for hearing loss. Eyes: Negative for blurred vision. Respiratory: Negative for shortness of breath. Cardiovascular: Positive for chest pain. Gastrointestinal: Positive for heartburn. Negative for blood in stool. Genitourinary: Positive for frequency. Neurological: Positive for headaches. Psychiatric/Behavioral: The patient is nervous/anxious. The patient does not have insomnia. Physical:   Visit Vitals  /65   Pulse 60   Temp 98.3 °F (36.8 °C) (Oral)   Resp 18   Ht 5' 2.5\" (1.588 m)   Wt 136 lb (61.7 kg)   SpO2 97%   BMI 24.48 kg/m²          Physical Exam   Constitutional: She is well-developed, well-nourished, and in no distress. HENT:   Mouth/Throat: Oropharynx is clear and moist.   Eyes: Conjunctivae are normal. No scleral icterus. Neck: No JVD present. Cardiovascular: Normal rate, regular rhythm, normal heart sounds and intact distal pulses. Pulmonary/Chest: Breath sounds normal.       Abdominal: Soft. Bowel sounds are normal. She exhibits no mass. There is no tenderness. Musculoskeletal: She exhibits no edema. Lymphadenopathy:     She has no cervical adenopathy.         Review of Data:  Labs:  Hospital Outpatient Visit on 08/22/2019   Component Date Value Ref Range Status    WBC 08/22/2019 5.6  4.6 - 13.2 K/uL Final    RBC 08/22/2019 3.89* 4.20 - 5.30 M/uL Final    HGB 08/22/2019 12.1  12.0 - 16.0 g/dL Final    HCT 08/22/2019 37.1  35.0 - 45.0 % Final    MCV 08/22/2019 95.4  74.0 - 97.0 FL Final    MCH 08/22/2019 31.1  24.0 - 34.0 PG Final    MCHC 08/22/2019 32.6  31.0 - 37.0 g/dL Final    RDW 08/22/2019 13.9  11.6 - 14.5 % Final    PLATELET 91/94/1578 561  135 - 420 K/uL Final    MPV 08/22/2019 9.5  9.2 - 11.8 FL Final    LIPID PROFILE 08/22/2019        Final    Cholesterol, total 08/22/2019 180  <200 MG/DL Final    Triglyceride 08/22/2019 129  <150 MG/DL Final    HDL Cholesterol 08/22/2019 59  40 - 60 MG/DL Final    LDL, calculated 08/22/2019 95.2  0 - 100 MG/DL Final    VLDL, calculated 08/22/2019 25.8  MG/DL Final    CHOL/HDL Ratio 08/22/2019 3.1  0 - 5.0   Final    Sodium 08/22/2019 135* 136 - 145 mmol/L Final    Potassium 08/22/2019 4.5  3.5 - 5.5 mmol/L Final    Chloride 08/22/2019 102  100 - 111 mmol/L Final    CO2 08/22/2019 30  21 - 32 mmol/L Final    Anion gap 08/22/2019 3  3.0 - 18 mmol/L Final    Glucose 08/22/2019 104* 74 - 99 mg/dL Final    BUN 08/22/2019 16  7.0 - 18 MG/DL Final    Creatinine 08/22/2019 0.82  0.6 - 1.3 MG/DL Final    BUN/Creatinine ratio 08/22/2019 20  12 - 20   Final    GFR est AA 08/22/2019 >60  >60 ml/min/1.73m2 Final    GFR est non-AA 08/22/2019 >60  >60 ml/min/1.73m2 Final    Calcium 08/22/2019 9.5  8.5 - 10.1 MG/DL Final    Bilirubin, total 08/22/2019 0.5  0.2 - 1.0 MG/DL Final    ALT (SGPT) 08/22/2019 24  13 - 56 U/L Final    AST (SGOT) 08/22/2019 15  10 - 38 U/L Final    Alk.  phosphatase 08/22/2019 99  45 - 117 U/L Final    Protein, total 08/22/2019 7.6  6.4 - 8.2 g/dL Final    Albumin 08/22/2019 4.0  3.4 - 5.0 g/dL Final    Globulin 08/22/2019 3.6  2.0 - 4.0 g/dL Final    A-G Ratio 08/22/2019 1.1  0.8 - 1.7   Final    Color 08/22/2019 YELLOW    Final    Appearance 08/22/2019 CLEAR    Final    Specific gravity 08/22/2019 1.015  1.005 - 1.030   Final    pH (UA) 08/22/2019 7.5  5.0 - 8.0   Final    Protein 08/22/2019 NEGATIVE   NEG mg/dL Final    Glucose 08/22/2019 NEGATIVE   NEG mg/dL Final    Ketone 08/22/2019 NEGATIVE   NEG mg/dL Final    Bilirubin 08/22/2019 NEGATIVE   NEG   Final    Blood 08/22/2019 NEGATIVE   NEG   Final    Urobilinogen 08/22/2019 0.2  0.2 - 1.0 EU/dL Final    Nitrites 08/22/2019 NEGATIVE   NEG   Final    Leukocyte Esterase 08/22/2019 NEGATIVE   NEG   Final    WBC 08/22/2019 0 to 2  0 - 4 /hpf Final    RBC 08/22/2019 0  0 - 5 /hpf Final    Epithelial cells 08/22/2019 FEW  0 - 5 /lpf Final    Bacteria 08/22/2019 NEGATIVE   NEG /hpf Final       Impression/Plan:   Patient Active Problem List   Diagnosis  Code    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 Marilee Avalos C88.4    Essential hypertension controlled   Continue current regimen I10    Mixed hyperlipidemia controlled   Continue current regimen E78.2    Acquired hypothyroidism on replacement   Repeat TSH with next visit E03.9    Anxiety about health   SSRI declined F41.8    Chest wall pain with negative PET/CT   Pain management declined R07.82    L1 vertebral fracture ([facility] [address] traumatic, in setting of osteopenia Management per Dr. Livan Castañeda, determine date of last DEXA S32.019A         Adalgisa Abreu MD

## 2019-08-29 NOTE — PROGRESS NOTES
Chief Complaint   Patient presents with    Thyroid Problem    Hypertension    Cholesterol Problem     1. Have you been to the ER, urgent care clinic since your last visit? Hospitalized since your last visit? No    2. Have you seen or consulted any other health care providers outside of the [address] [place] [address] since your last visit? Include any pap smears or colon screening.  No

## 2019-08-29 NOTE — PATIENT INSTRUCTIONS
Chest wall pain>>consider local steroid/lidocaine injection>>BONE SCAN NEGATIVE AND STRESS TEST NEGATIVE    Cholesterol>>controlled    BP>>controlled    Salt was slightly low>>reduce free water intake    MALT in remission    Flu vaccine in October Screening up to date